# Patient Record
Sex: FEMALE | Race: WHITE | NOT HISPANIC OR LATINO
[De-identification: names, ages, dates, MRNs, and addresses within clinical notes are randomized per-mention and may not be internally consistent; named-entity substitution may affect disease eponyms.]

---

## 2019-07-03 ENCOUNTER — APPOINTMENT (OUTPATIENT)
Dept: GASTROENTEROLOGY | Facility: CLINIC | Age: 59
End: 2019-07-03
Payer: MEDICARE

## 2019-07-03 VITALS
HEIGHT: 65 IN | HEART RATE: 56 BPM | OXYGEN SATURATION: 98 % | DIASTOLIC BLOOD PRESSURE: 80 MMHG | BODY MASS INDEX: 29.99 KG/M2 | WEIGHT: 180 LBS | SYSTOLIC BLOOD PRESSURE: 160 MMHG | RESPIRATION RATE: 16 BRPM

## 2019-07-03 DIAGNOSIS — K59.01 SLOW TRANSIT CONSTIPATION: ICD-10-CM

## 2019-07-03 DIAGNOSIS — R14.0 ABDOMINAL DISTENSION (GASEOUS): ICD-10-CM

## 2019-07-03 DIAGNOSIS — R11.2 NAUSEA WITH VOMITING, UNSPECIFIED: ICD-10-CM

## 2019-07-03 DIAGNOSIS — Z78.9 OTHER SPECIFIED HEALTH STATUS: ICD-10-CM

## 2019-07-03 DIAGNOSIS — E22.0 ACROMEGALY AND PITUITARY GIGANTISM: ICD-10-CM

## 2019-07-03 DIAGNOSIS — Z83.0 FAMILY HISTORY OF HUMAN IMMUNODEFICIENCY VIRUS [HIV] DISEASE: ICD-10-CM

## 2019-07-03 DIAGNOSIS — E78.5 HYPERLIPIDEMIA, UNSPECIFIED: ICD-10-CM

## 2019-07-03 DIAGNOSIS — T40.2X5A DRUG INDUCED CONSTIPATION: ICD-10-CM

## 2019-07-03 DIAGNOSIS — Z80.9 FAMILY HISTORY OF MALIGNANT NEOPLASM, UNSPECIFIED: ICD-10-CM

## 2019-07-03 DIAGNOSIS — F20.9 SCHIZOPHRENIA, UNSPECIFIED: ICD-10-CM

## 2019-07-03 DIAGNOSIS — I10 ESSENTIAL (PRIMARY) HYPERTENSION: ICD-10-CM

## 2019-07-03 DIAGNOSIS — F32.9 MAJOR DEPRESSIVE DISORDER, SINGLE EPISODE, UNSPECIFIED: ICD-10-CM

## 2019-07-03 DIAGNOSIS — K59.03 DRUG INDUCED CONSTIPATION: ICD-10-CM

## 2019-07-03 PROCEDURE — 99204 OFFICE O/P NEW MOD 45 MIN: CPT

## 2019-07-03 RX ORDER — FLUOXETINE HYDROCHLORIDE 20 MG/1
20 CAPSULE ORAL
Refills: 0 | Status: ACTIVE | COMMUNITY

## 2019-07-03 RX ORDER — SITAGLIPTIN 100 MG/1
TABLET, FILM COATED ORAL
Refills: 0 | Status: ACTIVE | COMMUNITY

## 2019-07-03 RX ORDER — ARIPIPRAZOLE 9.75 MG/1.3ML
INJECTION, SOLUTION INTRAMUSCULAR
Refills: 0 | Status: ACTIVE | COMMUNITY

## 2019-07-03 RX ORDER — BUPROPION HYDROCHLORIDE 75 MG/1
TABLET, FILM COATED ORAL
Refills: 0 | Status: ACTIVE | COMMUNITY

## 2019-07-03 RX ORDER — METHYLNALTREXONE BROMIDE 150 MG/1
150 TABLET ORAL DAILY
Qty: 90 | Refills: 2 | Status: ACTIVE | COMMUNITY
Start: 2019-07-03 | End: 1900-01-01

## 2019-07-03 RX ORDER — OCTREOTIDE ACETATE 30 MG
30 KIT INTRAMUSCULAR
Refills: 0 | Status: ACTIVE | COMMUNITY

## 2019-07-03 RX ORDER — LISINOPRIL 20 MG/1
20 TABLET ORAL
Refills: 0 | Status: ACTIVE | COMMUNITY

## 2019-07-03 RX ORDER — ATORVASTATIN CALCIUM 40 MG/1
40 TABLET, FILM COATED ORAL
Refills: 0 | Status: ACTIVE | COMMUNITY

## 2019-07-03 RX ORDER — METHADONE HYDROCHLORIDE 5 MG/1
TABLET ORAL
Refills: 0 | Status: ACTIVE | COMMUNITY

## 2019-07-03 RX ORDER — SODIUM SULFATE, POTASSIUM SULFATE, MAGNESIUM SULFATE 17.5; 3.13; 1.6 G/ML; G/ML; G/ML
17.5-3.13-1.6 SOLUTION, CONCENTRATE ORAL
Qty: 1 | Refills: 0 | Status: ACTIVE | COMMUNITY
Start: 2019-07-03 | End: 1900-01-01

## 2019-07-03 NOTE — PHYSICAL EXAM
[General Appearance - In No Acute Distress] : in no acute distress [Sclera] : the sclera and conjunctiva were normal [General Appearance - Alert] : alert [Outer Ear] : the ears and nose were normal in appearance [PERRL With Normal Accommodation] : pupils were equal in size, round, and reactive to light [Extraocular Movements] : extraocular movements were intact [Neck Appearance] : the appearance of the neck was normal [Oropharynx] : the oropharynx was normal [Jugular Venous Distention Increased] : there was no jugular-venous distention [Neck Cervical Mass (___cm)] : no neck mass was observed [Thyroid Nodule] : there were no palpable thyroid nodules [Thyroid Diffuse Enlargement] : the thyroid was not enlarged [Auscultation Breath Sounds / Voice Sounds] : lungs were clear to auscultation bilaterally [Heart Rate And Rhythm] : heart rate was normal and rhythm regular [Heart Sounds] : normal S1 and S2 [Heart Sounds Gallop] : no gallops [Murmurs] : no murmurs [Abdomen Soft] : soft [Heart Sounds Pericardial Friction Rub] : no pericardial rub [Bowel Sounds] : normal bowel sounds [Abdomen Tenderness] : non-tender [Abdomen Mass (___ Cm)] : no abdominal mass palpated [Abnormal Walk] : normal gait [Nail Clubbing] : no clubbing  or cyanosis of the fingernails [Skin Color & Pigmentation] : normal skin color and pigmentation [Motor Tone] : muscle strength and tone were normal [Musculoskeletal - Swelling] : no joint swelling seen [Skin Turgor] : normal skin turgor [] : no rash [Deep Tendon Reflexes (DTR)] : deep tendon reflexes were 2+ and symmetric [Sensation] : the sensory exam was normal to light touch and pinprick [No Focal Deficits] : no focal deficits [Oriented To Time, Place, And Person] : oriented to person, place, and time [Impaired Insight] : insight and judgment were intact [Affect] : the affect was normal

## 2019-07-03 NOTE — ASSESSMENT
[FreeTextEntry1] : The patient presents with worsening constipation despite treatment with Linzess. It is possible that her constipation may be related to her Methadone use and would therefore benefit from Relistor which is better suited for opioid induced constipation. A prescription was sent to her pharmacy. She will continue the MiraLAX as needed and add a fiber supplement like Benefiber daily. \par She will obtain routine lab work consisting of a CBC, CMP, and TSH.\par Her last colonosocpy was over 4 years ago and she has a family history of colon polyps. She will be scheduled for a colonoscopy with Suprep and a dose of magnesium citrate at noon on prep day. I have discussed the indications (including but not limited to ruling out inflammatory bowel disease, colorectal neoplasm, GI bleed, and AVM's), benefits, risks  (including but not limited to reaction to the anesthesia, infection, bleeding, and perforation),  and alternatives to colonoscopy with the patient. The patient understands all options and has agreed to have a colonoscopy and is medically optimized for the planned procedure. \par \par GI attending:  History, physical, the assessment, plan generated by a nurse practitioner, Suzanne Bernal has been reviewed and confirmed. Patient has a prolonged history of opiate induced constipation. There is distant history of rectal bleeding and a family history of colon polyps. Last colonoscopy was over 4 years ago. Linzess provided  no significant improvement.  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 q.d. will be attempted.\par Colonoscopy appropriate and will be arranged for citrate of magnesia along with the usual prep, will be utilized. Blood work prior to the procedure will be requested. Results to follow. \par She will follow up in 3-4 months.

## 2019-07-03 NOTE — HISTORY OF PRESENT ILLNESS
[Heartburn] : denies heartburn [Nausea] : denies nausea [Diarrhea] : denies diarrhea [Vomiting] : stable vomiting [Constipation] : constipation worsened [Abdominal Pain] : denies abdominal pain [Yellow Skin Or Eyes (Jaundice)] : denies jaundice [Abdominal Swelling] : abdominal swelling stable [Wt Loss ___ Lbs] : recent [unfilled] ~Upound(s) weight loss [Rectal Pain] : denies rectal pain [de-identified] : LAKISHA BATISTA is a 58 year old female presenting today with complaints of She reports that she has had constipation issues for most of her life but it has worsened over the last year. She moves her bowels 1-2 times a week and feels that she does not fully evacuate. She denies abdominal pain but does report cramping, mostly in the mornings as well as nausea and occasional vomiting in the mornings as well. She denies reflux symptoms. She has lost roughly 15 lbs in the last 2 months but she attributes it to a change in her psych meds. She has a history of schizophrenia. She has also been on Methadone for over 15 years. She was seeing a GI doctor in [place] who had prescribed her Linzess 290 mcg. She states that it worked "only a little" for the first week and then stopped. She has been on it for a little over 1 month. She also uses MiraLAX BID and occasionally magnesium citrate.  Her last colonoscopy was 4 years ago. She has no family history of colon cancer but her mother has had colon polyps.

## 2019-10-03 ENCOUNTER — APPOINTMENT (OUTPATIENT)
Dept: GASTROENTEROLOGY | Facility: GI CENTER | Age: 59
End: 2019-10-03
Payer: MEDICARE

## 2019-10-03 ENCOUNTER — OUTPATIENT (OUTPATIENT)
Dept: OUTPATIENT SERVICES | Facility: HOSPITAL | Age: 59
LOS: 1 days | End: 2019-10-03
Payer: MEDICARE

## 2019-10-03 DIAGNOSIS — Z83.71 FAMILY HISTORY OF COLONIC POLYPS: ICD-10-CM

## 2019-10-03 DIAGNOSIS — D17.5 BENIGN LIPOMATOUS NEOPLASM OF INTRA-ABDOMINAL ORGANS: ICD-10-CM

## 2019-10-03 DIAGNOSIS — Z12.11 ENCOUNTER FOR SCREENING FOR MALIGNANT NEOPLASM OF COLON: ICD-10-CM

## 2019-10-03 DIAGNOSIS — R14.0 ABDOMINAL DISTENSION (GASEOUS): ICD-10-CM

## 2019-10-03 LAB — GLUCOSE BLDC GLUCOMTR-MCNC: 153 MG/DL — HIGH (ref 70–99)

## 2019-10-03 PROCEDURE — G0105: CPT

## 2019-10-03 PROCEDURE — 82962 GLUCOSE BLOOD TEST: CPT

## 2019-10-03 PROCEDURE — 45378 DIAGNOSTIC COLONOSCOPY: CPT

## 2019-10-03 NOTE — PHYSICAL EXAM
[General Appearance - Alert] : alert [General Appearance - In No Acute Distress] : in no acute distress [Sclera] : the sclera and conjunctiva were normal [PERRL With Normal Accommodation] : pupils were equal in size, round, and reactive to light [Extraocular Movements] : extraocular movements were intact [Outer Ear] : the ears and nose were normal in appearance [Oropharynx] : the oropharynx was normal [Neck Appearance] : the appearance of the neck was normal [Neck Cervical Mass (___cm)] : no neck mass was observed [Jugular Venous Distention Increased] : there was no jugular-venous distention [Thyroid Nodule] : there were no palpable thyroid nodules [Thyroid Diffuse Enlargement] : the thyroid was not enlarged [Auscultation Breath Sounds / Voice Sounds] : lungs were clear to auscultation bilaterally [Heart Rate And Rhythm] : heart rate was normal and rhythm regular [Heart Sounds] : normal S1 and S2 [Heart Sounds Gallop] : no gallops [Murmurs] : no murmurs [Heart Sounds Pericardial Friction Rub] : no pericardial rub [Bowel Sounds] : normal bowel sounds [Abdomen Tenderness] : non-tender [Abdomen Soft] : soft [] : no hepato-splenomegaly [Abdomen Mass (___ Cm)] : no abdominal mass palpated [No Rectal Mass] : no rectal mass [Normal Sphincter Tone] : normal sphincter tone [Occult Blood Positive] : stool was negative for occult blood [FreeTextEntry1] : Empty rectal vault.

## 2019-10-03 NOTE — PROCEDURE
[Colon Cancer Screening] : colon cancer screening [Fm Hx of Colon Ca/Polyps] : family history of colon cancer and/or polyps [Procedure Explained] : The procedure was explained [Allergies Reviewed] : allergies reviewed. [Risks] : Risks [Benefits] : benefits [Alternatives] : alternatives [Consent Obtained] : written consent was obtained prior to the procedure and is detailed in the patient's record [Patient] : the patient [Bowel Prep Kit] : the patient took the appropriate bowel preparation kit as directed [Approved Diet Followed] : the patient avoided solid foods and adhered to the approved diet list for 24 hours prior to the procedure [Automated Blood Pressure Cuff] : automated blood pressure cuff [Cardiac Monitor] : cardiac monitor [Pulse Oximeter] : pulse oximeter [Propofol ___ mg IV] : Propofol [unfilled] ~Umg intravenously [3] : 3 [Prep Qualtiy: ___] : Prep Quality:  [unfilled] [Withdrawal Time: ___] : Withdrawal Time:  [unfilled] [Abnormal Rectum] : a normal rectum [Left Lateral Decubitus] : The patient was positioned in the left lateral decubitus position [External Hemorrhoids] : no external hemorrhoids [Cecum (Landmarks/Transillum)] : and guided to the cecum which was identified by the anatomic landmarks of the appendiceal orifice and ileocecal valve and by transillumination in the right lower quadrant [No Difficulty] : without difficulty [Insufflated] : insufflated [Multiple Passes Needed] : after multiple passes [Retroflex View] : a retroflex view of the rectum was performed [Lipoma] : lipoma [Normal] : Normal [Tolerated Well] : the patient tolerated the procedure well [Vital Signs Stable] : the vital signs were stable [No Complications] : There were no complications [de-identified] : Mostly seen from afar.  ICV was normal.  TI not entered.   [de-identified] : [id number] [de-identified] : a large 2 cm lipoma was seen in the AC.  Normal overlying mucosa.  no bx.   [de-identified] : Lipoma in AC.  Air insufflation failure.  Fixed. Required extensive lavage and suctioning to get a fair prep.

## 2019-10-03 NOTE — HISTORY OF PRESENT ILLNESS
[FreeTextEntry1] : 57 yo WF with DM. HTN, HLD, Chronic pain on Narcs, OIC better with Relastor.  + FH of colon polyps.  Neg screening colonoscopy 4+ yrs ago.

## 2019-10-03 NOTE — PHYSICAL EXAM
[General Appearance - Alert] : alert [General Appearance - In No Acute Distress] : in no acute distress [Sclera] : the sclera and conjunctiva were normal [PERRL With Normal Accommodation] : pupils were equal in size, round, and reactive to light [Extraocular Movements] : extraocular movements were intact [Outer Ear] : the ears and nose were normal in appearance [Oropharynx] : the oropharynx was normal [Neck Appearance] : the appearance of the neck was normal [Neck Cervical Mass (___cm)] : no neck mass was observed [Jugular Venous Distention Increased] : there was no jugular-venous distention [Thyroid Nodule] : there were no palpable thyroid nodules [Thyroid Diffuse Enlargement] : the thyroid was not enlarged [Heart Rate And Rhythm] : heart rate was normal and rhythm regular [Auscultation Breath Sounds / Voice Sounds] : lungs were clear to auscultation bilaterally [Heart Sounds Gallop] : no gallops [Heart Sounds] : normal S1 and S2 [Murmurs] : no murmurs [Heart Sounds Pericardial Friction Rub] : no pericardial rub [Bowel Sounds] : normal bowel sounds [Abdomen Tenderness] : non-tender [Abdomen Soft] : soft [] : no hepato-splenomegaly [Abdomen Mass (___ Cm)] : no abdominal mass palpated [Normal Sphincter Tone] : normal sphincter tone [No Rectal Mass] : no rectal mass [Occult Blood Positive] : stool was negative for occult blood [FreeTextEntry1] : Empty rectal vault.

## 2019-10-03 NOTE — ASSESSMENT
[FreeTextEntry1] : Negative screening colonoscopy;  Given + FH of colon polyps, Repeat in 5 yrs advised for screening.  High fiber diet for constipation, OIC. GI OV in 6 months.

## 2019-10-03 NOTE — REASON FOR VISIT
[Follow-Up: _____] : a [unfilled] follow-up visit [Colonoscopy] : a colonoscopy [FreeTextEntry2] : +FH of colon polyps

## 2019-10-03 NOTE — PROCEDURE
[Colon Cancer Screening] : colon cancer screening [Fm Hx of Colon Ca/Polyps] : family history of colon cancer and/or polyps [Procedure Explained] : The procedure was explained [Allergies Reviewed] : allergies reviewed. [Risks] : Risks [Benefits] : benefits [Alternatives] : alternatives [Consent Obtained] : written consent was obtained prior to the procedure and is detailed in the patient's record [Bowel Prep Kit] : the patient took the appropriate bowel preparation kit as directed [Patient] : the patient [Approved Diet Followed] : the patient avoided solid foods and adhered to the approved diet list for 24 hours prior to the procedure [Automated Blood Pressure Cuff] : automated blood pressure cuff [Cardiac Monitor] : cardiac monitor [Pulse Oximeter] : pulse oximeter [Propofol ___ mg IV] : Propofol [unfilled] ~Umg intravenously [3] : 3 [Prep Qualtiy: ___] : Prep Quality:  [unfilled] [Withdrawal Time: ___] : Withdrawal Time:  [unfilled] [Left Lateral Decubitus] : The patient was positioned in the left lateral decubitus position [Abnormal Rectum] : a normal rectum [External Hemorrhoids] : no external hemorrhoids [Cecum (Landmarks/Transillum)] : and guided to the cecum which was identified by the anatomic landmarks of the appendiceal orifice and ileocecal valve and by transillumination in the right lower quadrant [No Difficulty] : without difficulty [Insufflated] : insufflated [Multiple Passes Needed] : after multiple passes [Retroflex View] : a retroflex view of the rectum was performed [Lipoma] : lipoma [Normal] : Normal [Tolerated Well] : the patient tolerated the procedure well [Vital Signs Stable] : the vital signs were stable [No Complications] : There were no complications [de-identified] : [id number] [de-identified] : Mostly seen from afar.  ICV was normal.  TI not entered.   [de-identified] : a large 2 cm lipoma was seen in the AC.  Normal overlying mucosa.  no bx.   [de-identified] : Lipoma in AC.  Air insufflation failure.  Fixed. Required extensive lavage and suctioning to get a fair prep.

## 2019-10-03 NOTE — HISTORY OF PRESENT ILLNESS
[FreeTextEntry1] : 59 yo WF with DM. HTN, HLD, Chronic pain on Narcs, OIC better with Relastor.  + FH of colon polyps.  Neg screening colonoscopy 4+ yrs ago.

## 2021-10-06 PROBLEM — I10 ESSENTIAL HYPERTENSION: Status: ACTIVE | Noted: 2019-07-03

## 2022-06-29 ENCOUNTER — OFFICE (OUTPATIENT)
Dept: URBAN - METROPOLITAN AREA CLINIC 105 | Facility: CLINIC | Age: 62
Setting detail: OPHTHALMOLOGY
End: 2022-06-29
Payer: MEDICARE

## 2022-06-29 DIAGNOSIS — E11.9: ICD-10-CM

## 2022-06-29 DIAGNOSIS — H52.7: ICD-10-CM

## 2022-06-29 DIAGNOSIS — H25.013: ICD-10-CM

## 2022-06-29 DIAGNOSIS — E05.00: ICD-10-CM

## 2022-06-29 DIAGNOSIS — H35.372: ICD-10-CM

## 2022-06-29 DIAGNOSIS — H40.013: ICD-10-CM

## 2022-06-29 PROCEDURE — 76514 ECHO EXAM OF EYE THICKNESS: CPT | Performed by: OPHTHALMOLOGY

## 2022-06-29 PROCEDURE — 92004 COMPRE OPH EXAM NEW PT 1/>: CPT | Performed by: OPHTHALMOLOGY

## 2022-06-29 PROCEDURE — 92015 DETERMINE REFRACTIVE STATE: CPT | Performed by: OPHTHALMOLOGY

## 2022-06-29 PROCEDURE — 92250 FUNDUS PHOTOGRAPHY W/I&R: CPT | Performed by: OPHTHALMOLOGY

## 2022-06-29 ASSESSMENT — REFRACTION_CURRENTRX
OD_AXIS: 002
OD_OVR_VA: 20/
OS_CYLINDER: -1.50
OD_AXIS: 180
OS_SPHERE: -0.75
OD_SPHERE: -1.00
OS_OVR_VA: 20/
OS_AXIS: 168
OS_SPHERE: -0.75
OD_CYLINDER: -1.00
OS_OVR_VA: 20/
OD_OVR_VA: 20/
OS_CYLINDER: -1.50
OS_AXIS: 162
OD_SPHERE: -1.00
OD_CYLINDER: -1.00

## 2022-06-29 ASSESSMENT — REFRACTION_MANIFEST
OD_VA1: 20/25-2
OS_VA1: 20/20-1
OS_CYLINDER: -1.75
OD_ADD: +2.25
OS_SPHERE: -0.50
OS_ADD: +2.25
OS_AXIS: 065
OD_SPHERE: -0.50

## 2022-06-29 ASSESSMENT — KERATOMETRY
OS_K2POWER_DIOPTERS: 47.50
OD_K2POWER_DIOPTERS: 46.25
OS_AXISANGLE_DEGREES: 078
OD_K1POWER_DIOPTERS: 45.50
OD_AXISANGLE_DEGREES: 096
OS_K1POWER_DIOPTERS: 45.75

## 2022-06-29 ASSESSMENT — REFRACTION_AUTOREFRACTION
OD_CYLINDER: -0.25
OS_CYLINDER: -1.50
OD_SPHERE: -0.50
OD_AXIS: 112
OS_SPHERE: -0.75
OS_AXIS: 169

## 2022-06-29 ASSESSMENT — PACHYMETRY
OD_CT_UM: 562
OD_CT_CORRECTION: -1
OS_CT_CORRECTION: 3
OS_CT_UM: 505

## 2022-06-29 ASSESSMENT — VISUAL ACUITY
OD_BCVA: 20/20-1
OS_BCVA: 20/25-1

## 2022-06-29 ASSESSMENT — CONFRONTATIONAL VISUAL FIELD TEST (CVF)
OS_FINDINGS: FULL
OD_FINDINGS: FULL

## 2022-06-29 ASSESSMENT — SPHEQUIV_DERIVED
OS_SPHEQUIV: -1.5
OD_SPHEQUIV: -0.625
OS_SPHEQUIV: -1.375

## 2022-06-29 ASSESSMENT — AXIALLENGTH_DERIVED
OD_AL: 22.9794
OS_AL: 22.9953
OS_AL: 23.0416

## 2022-06-29 ASSESSMENT — TONOMETRY: OD_IOP_MMHG: 21

## 2022-11-11 ENCOUNTER — APPOINTMENT (OUTPATIENT)
Dept: ORTHOPEDIC SURGERY | Facility: CLINIC | Age: 62
End: 2022-11-11

## 2022-12-01 ENCOUNTER — OFFICE (OUTPATIENT)
Dept: URBAN - METROPOLITAN AREA CLINIC 113 | Facility: CLINIC | Age: 62
Setting detail: OPHTHALMOLOGY
End: 2022-12-01
Payer: MEDICARE

## 2022-12-01 DIAGNOSIS — H40.013: ICD-10-CM

## 2022-12-01 DIAGNOSIS — H25.013: ICD-10-CM

## 2022-12-01 PROBLEM — H35.372 EPIRETINAL MEMBRANE; LEFT EYE: Status: ACTIVE | Noted: 2022-06-29

## 2022-12-01 PROBLEM — H40.033 NARROW ANGLES; BOTH EYES: Status: ACTIVE | Noted: 2022-12-01

## 2022-12-01 PROBLEM — E05.00 GRAVES/THYROID OPH NO CRISIS: Status: ACTIVE | Noted: 2022-06-29

## 2022-12-01 PROBLEM — H52.7 REFRACTIVE ERROR: Status: ACTIVE | Noted: 2022-06-29

## 2022-12-01 PROBLEM — D35.2 PITUITARY TUMOR BENIGN: Status: ACTIVE | Noted: 2022-12-01

## 2022-12-01 PROBLEM — H18.513 ENDOTHELIAL CORNEAL DYSTROPHY; BOTH EYES: Status: ACTIVE | Noted: 2022-12-01

## 2022-12-01 PROBLEM — E11.9: Status: ACTIVE | Noted: 2022-06-29

## 2022-12-01 PROCEDURE — 92020 GONIOSCOPY: CPT | Performed by: OPHTHALMOLOGY

## 2022-12-01 PROCEDURE — 92250 FUNDUS PHOTOGRAPHY W/I&R: CPT | Performed by: OPHTHALMOLOGY

## 2022-12-01 PROCEDURE — 99214 OFFICE O/P EST MOD 30 MIN: CPT | Performed by: OPHTHALMOLOGY

## 2022-12-01 ASSESSMENT — REFRACTION_MANIFEST
OD_SPHERE: -0.50
OS_SPHERE: -0.50
OS_CYLINDER: -1.75
OD_VA1: 20/25-2
OS_ADD: +2.25
OS_VA1: 20/20-1
OS_AXIS: 065
OD_ADD: +2.25

## 2022-12-01 ASSESSMENT — AXIALLENGTH_DERIVED
OS_AL: 23.3129
OD_AL: 23.1162
OS_AL: 23.1711

## 2022-12-01 ASSESSMENT — REFRACTION_CURRENTRX
OS_OVR_VA: 20/
OS_CYLINDER: -1.50
OD_CYLINDER: -1.00
OD_OVR_VA: 20/
OS_OVR_VA: 20/
OS_CYLINDER: -1.50
OS_AXIS: 162
OS_SPHERE: -0.75
OD_SPHERE: -1.00
OD_CYLINDER: -1.00
OS_SPHERE: -0.75
OD_SPHERE: -1.00
OD_OVR_VA: 20/
OD_AXIS: 002
OS_AXIS: 168
OD_AXIS: 180

## 2022-12-01 ASSESSMENT — KERATOMETRY
OS_K2POWER_DIOPTERS: 47.25
OS_AXISANGLE_DEGREES: 084
OS_K1POWER_DIOPTERS: 45.00
OD_K1POWER_DIOPTERS: 45.25
OD_K2POWER_DIOPTERS: 46.25
OD_AXISANGLE_DEGREES: 089

## 2022-12-01 ASSESSMENT — REFRACTION_AUTOREFRACTION
OD_SPHERE: -0.75
OS_AXIS: 168
OD_AXIS: 160
OS_CYLINDER: -1.50
OD_CYLINDER: -0.25
OS_SPHERE: -1.00

## 2022-12-01 ASSESSMENT — TONOMETRY
OD_IOP_MMHG: 15
OS_IOP_MMHG: 17

## 2022-12-01 ASSESSMENT — SPHEQUIV_DERIVED
OS_SPHEQUIV: -1.75
OD_SPHEQUIV: -0.875
OS_SPHEQUIV: -1.375

## 2022-12-01 ASSESSMENT — VISUAL ACUITY
OS_BCVA: 20/30-1
OD_BCVA: 20/20

## 2022-12-01 ASSESSMENT — PACHYMETRY
OS_CT_CORRECTION: 3
OD_CT_CORRECTION: -1
OD_CT_UM: 562
OS_CT_UM: 505

## 2022-12-01 ASSESSMENT — CONFRONTATIONAL VISUAL FIELD TEST (CVF)
OD_FINDINGS: FULL
OS_FINDINGS: FULL

## 2023-01-23 ENCOUNTER — OFFICE (OUTPATIENT)
Dept: URBAN - METROPOLITAN AREA CLINIC 94 | Facility: CLINIC | Age: 63
Setting detail: OPHTHALMOLOGY
End: 2023-01-23
Payer: MEDICARE

## 2023-01-23 DIAGNOSIS — Z20.822: ICD-10-CM

## 2023-01-23 DIAGNOSIS — Z01.812: ICD-10-CM

## 2023-01-23 PROCEDURE — 99211 OFF/OP EST MAY X REQ PHY/QHP: CPT | Performed by: OPHTHALMOLOGY

## 2023-01-24 ASSESSMENT — REFRACTION_MANIFEST
OS_SPHERE: -0.50
OD_SPHERE: -0.50
OD_VA1: 20/25-2
OS_ADD: +2.25
OS_AXIS: 065
OD_ADD: +2.25
OS_CYLINDER: -1.75
OS_VA1: 20/20-1

## 2023-01-24 ASSESSMENT — AXIALLENGTH_DERIVED
OS_AL: 23.3129
OD_AL: 23.1162
OS_AL: 23.1711

## 2023-01-24 ASSESSMENT — REFRACTION_CURRENTRX
OS_OVR_VA: 20/
OS_SPHERE: -0.75
OD_OVR_VA: 20/
OD_CYLINDER: -1.00
OS_OVR_VA: 20/
OD_AXIS: 180
OS_CYLINDER: -1.50
OS_CYLINDER: -1.50
OD_AXIS: 002
OS_SPHERE: -0.75
OD_SPHERE: -1.00
OD_SPHERE: -1.00
OD_OVR_VA: 20/
OS_AXIS: 162
OS_AXIS: 168
OD_CYLINDER: -1.00

## 2023-01-24 ASSESSMENT — VISUAL ACUITY
OS_BCVA: 20/30-1
OD_BCVA: 20/20

## 2023-01-24 ASSESSMENT — REFRACTION_AUTOREFRACTION
OD_SPHERE: -0.75
OD_CYLINDER: -0.25
OS_AXIS: 168
OD_AXIS: 160
OS_SPHERE: -1.00
OS_CYLINDER: -1.50

## 2023-01-24 ASSESSMENT — SPHEQUIV_DERIVED
OD_SPHEQUIV: -0.875
OS_SPHEQUIV: -1.75
OS_SPHEQUIV: -1.375

## 2023-01-24 ASSESSMENT — KERATOMETRY
OD_AXISANGLE_DEGREES: 089
OD_K1POWER_DIOPTERS: 45.25
OS_K2POWER_DIOPTERS: 47.25
OD_K2POWER_DIOPTERS: 46.25
OS_K1POWER_DIOPTERS: 45.00
OS_AXISANGLE_DEGREES: 084

## 2023-01-26 ENCOUNTER — ASC (OUTPATIENT)
Dept: URBAN - METROPOLITAN AREA SURGERY 8 | Facility: SURGERY | Age: 63
Setting detail: OPHTHALMOLOGY
End: 2023-01-26
Payer: MEDICARE

## 2023-01-26 DIAGNOSIS — H40.031: ICD-10-CM

## 2023-01-26 PROCEDURE — 66761 REVISION OF IRIS: CPT | Performed by: OPHTHALMOLOGY

## 2023-01-26 ASSESSMENT — REFRACTION_CURRENTRX
OD_CYLINDER: -1.00
OD_OVR_VA: 20/
OD_SPHERE: -1.00
OD_SPHERE: -1.00
OS_AXIS: 162
OD_OVR_VA: 20/
OS_OVR_VA: 20/
OS_OVR_VA: 20/
OD_CYLINDER: -1.00
OS_SPHERE: -0.75
OS_CYLINDER: -1.50
OD_AXIS: 002
OS_AXIS: 168
OS_CYLINDER: -1.50
OS_SPHERE: -0.75
OD_AXIS: 180

## 2023-01-26 ASSESSMENT — KERATOMETRY
OS_K2POWER_DIOPTERS: 47.25
OS_AXISANGLE_DEGREES: 084
OD_K1POWER_DIOPTERS: 45.25
OD_AXISANGLE_DEGREES: 089
OS_K1POWER_DIOPTERS: 45.00
OD_K2POWER_DIOPTERS: 46.25

## 2023-01-26 ASSESSMENT — AXIALLENGTH_DERIVED
OD_AL: 23.1162
OS_AL: 23.1711
OS_AL: 23.3129

## 2023-01-26 ASSESSMENT — REFRACTION_MANIFEST
OS_SPHERE: -0.50
OS_AXIS: 065
OD_SPHERE: -0.50
OS_VA1: 20/20-1
OS_CYLINDER: -1.75
OS_ADD: +2.25
OD_VA1: 20/25-2
OD_ADD: +2.25

## 2023-01-26 ASSESSMENT — REFRACTION_AUTOREFRACTION
OS_SPHERE: -1.00
OS_CYLINDER: -1.50
OD_CYLINDER: -0.25
OD_AXIS: 160
OS_AXIS: 168
OD_SPHERE: -0.75

## 2023-01-26 ASSESSMENT — VISUAL ACUITY
OS_BCVA: 20/30-1
OD_BCVA: 20/20

## 2023-01-26 ASSESSMENT — SPHEQUIV_DERIVED
OS_SPHEQUIV: -1.75
OD_SPHEQUIV: -0.875
OS_SPHEQUIV: -1.375

## 2023-01-27 ENCOUNTER — ASC (OUTPATIENT)
Dept: URBAN - METROPOLITAN AREA SURGERY 8 | Facility: SURGERY | Age: 63
Setting detail: OPHTHALMOLOGY
End: 2023-01-27
Payer: MEDICARE

## 2023-01-27 DIAGNOSIS — H40.032: ICD-10-CM

## 2023-01-27 PROBLEM — H25.13 CATARACT SENILE NUCLEAR SCLEROSIS; BOTH EYES: Status: ACTIVE | Noted: 2023-01-23

## 2023-01-27 PROCEDURE — 66761 REVISION OF IRIS: CPT | Performed by: OPHTHALMOLOGY

## 2023-01-27 ASSESSMENT — KERATOMETRY
OS_K2POWER_DIOPTERS: 47.25
OS_K1POWER_DIOPTERS: 45.00
OD_K1POWER_DIOPTERS: 45.25
OS_AXISANGLE_DEGREES: 084
OD_K2POWER_DIOPTERS: 46.25
OD_AXISANGLE_DEGREES: 089

## 2023-01-27 ASSESSMENT — SPHEQUIV_DERIVED
OD_SPHEQUIV: -0.875
OS_SPHEQUIV: -1.375
OS_SPHEQUIV: -1.75

## 2023-01-27 ASSESSMENT — REFRACTION_CURRENTRX
OS_CYLINDER: -1.50
OD_OVR_VA: 20/
OD_OVR_VA: 20/
OD_CYLINDER: -1.00
OS_AXIS: 168
OS_OVR_VA: 20/
OD_AXIS: 180
OS_AXIS: 162
OS_SPHERE: -0.75
OD_AXIS: 002
OS_CYLINDER: -1.50
OS_SPHERE: -0.75
OD_CYLINDER: -1.00
OD_SPHERE: -1.00
OD_SPHERE: -1.00
OS_OVR_VA: 20/

## 2023-01-27 ASSESSMENT — VISUAL ACUITY
OD_BCVA: 20/20
OS_BCVA: 20/30-1

## 2023-01-27 ASSESSMENT — REFRACTION_AUTOREFRACTION
OD_CYLINDER: -0.25
OS_AXIS: 168
OD_AXIS: 160
OD_SPHERE: -0.75
OS_SPHERE: -1.00
OS_CYLINDER: -1.50

## 2023-01-27 ASSESSMENT — AXIALLENGTH_DERIVED
OD_AL: 23.1162
OS_AL: 23.3129
OS_AL: 23.1711

## 2023-01-27 ASSESSMENT — REFRACTION_MANIFEST
OS_CYLINDER: -1.75
OD_ADD: +2.25
OD_VA1: 20/25-2
OS_AXIS: 065
OD_SPHERE: -0.50
OS_VA1: 20/20-1
OS_SPHERE: -0.50
OS_ADD: +2.25

## 2023-04-19 ENCOUNTER — APPOINTMENT (OUTPATIENT)
Dept: ORTHOPEDIC SURGERY | Facility: CLINIC | Age: 63
End: 2023-04-19
Payer: MEDICARE

## 2023-04-19 VITALS — BODY MASS INDEX: 29.99 KG/M2 | WEIGHT: 180 LBS | HEIGHT: 65 IN

## 2023-04-19 PROCEDURE — 99204 OFFICE O/P NEW MOD 45 MIN: CPT | Mod: 25

## 2023-04-19 PROCEDURE — 72100 X-RAY EXAM L-S SPINE 2/3 VWS: CPT

## 2023-04-23 NOTE — HISTORY OF PRESENT ILLNESS
[de-identified] : 4/19/23: 61 y/o F presenting for an initial evaluation of L spine. Chronic hx of L spine issues for years. She reports increased low back pain starting approx 8 months ago. She also reports a fall a few months ago, re-aggravating her symptoms. Today, she complaints of axial lower back pain, pain radiating into the left buttock, does not extend into the leg, and instability while ambulating. In the past, she has treated with chiropractic therapy. There was some element of PT at the chiropractic office. Severity of pain ranges from mild to severe. Limited with naids due to CKD, but she has taken ibuprofen with relief. Pain improves with rest. No neurologic symptoms or change in LE strength. No bowel/bladder dysfunction. No constitutional symptoms. General medical health is good.  [FreeTextEntry5] : The patient is a 62 year old  hand dominant female who presents today complaining of lower back pain that radiates to the sacrum area, down to gluteus and rotator hip\par Date of Injury/Onset:  2/2023\par Pain:    At Rest: 1-2/10 \par With Activity:  10/10 \par Mechanism of injury: Cleaning up the attic of her house stepped in a vulnerable area and the sealing fell apart and the Rt leg went through the sheet rock.\par Quality of symptoms: Cant move, weakness\par Improves with: Rest, laying\par Worse with: Getting up from bed, getting up from a sitting position, standing for prolonged times\par Prior treatment: None\par Prior Imaging: None\par Out of work/sport: _, since _\par School/Sport/Position/Occupation: Retired\par Additional Information: None\par

## 2023-04-23 NOTE — PHYSICAL EXAM
[Normal Coordination] : normal coordination [Normal DTR UE/LE] : normal DTR UE/LE  [Normal Sensation] : normal sensation [Normal Mood and Affect] : normal mood and affect [Orientated] : orientated [Able to Communicate] : able to communicate [Normal Skin] : normal skin [No Rash] : no rash [No Ulcers] : no ulcers [No Lesions] : no lesions [No obvious lymphadenopathy in areas examined] : no obvious lymphadenopathy in areas examined [Well Developed] : well developed [Peripheral vascular exam is grossly normal] : peripheral vascular exam is grossly normal [No Respiratory Distress] : no respiratory distress [de-identified] : Constitutional:\par - General Appearance:\par Unremarkable\par Body Habitus\par Well Developed\par Well Nourished\par Body Habitus\par No Deformities\par Well Groomed\par Ability To communicate:\par Normal\par Neurologic:\par Global sensation is intact to upper and lower extremities. See examination of Neck and/or Spine\par for exceptions.\par Orientation to Time, Place and Person is: Normal\par Mood And Affect is Normal\par Skin:\par - Head/Face, Right Upper/Lower Extremity, Left Upper/Lower Extremity: Normal\par See Examination of Neck and/or Spine for exceptions\par Cardiovascular:\par Peripheral Cardiovascular System is Normal\par Palpation of Lymph Nodes:\par Normal Palpation of lymph nodes in: Axilla, Cervical, Inguinal\par Abnormal Palpation of lymph nodes in: None  [] : non-antalgic

## 2023-04-23 NOTE — DISCUSSION/SUMMARY
[de-identified] : In office x-rays Lumbar spine ap/lat demonstrates spondylolisthesis L4/5. \par Patient was provided with a referral for lumbar physical therapy to work on stretching, strengthening and range of motion.\par  Discussed limitation of treatment with nsaids due to kidney dx. Continue Tylenol PRN.\par MRI request and injections if symptoms do not improve from PT. Discussed surgical decompression and fusion if refractory to non operative mgmt.\par F/u 5/6 wks. \par \par Prior to appointment and during encounter with patient extensive medical records were reviewed including but not limited to, hospital records, outpatient records, imaging results, and lab data.During this appointment the patient was examined, diagnoses were discussed and explained in a face to face manner. In addition extensive time was spent reviewing aforementioned diagnostic studies. Counseling including abnormal image results, differential diagnoses, treatment options, risk and benefits, lifestyle changes, current condition, and current medications was performed. Patient's comments, questions, and concerns were addressed and patient verbalized understanding. Based on this patient's presentation at our office, which is an orthopedic spine surgeon's office, this patient inherently / intrinsically has a risk, however minute, of developing issues such as Cauda equina syndrome, bowel and bladder changes, or progression of motor or neurological deficits such as paralysis which may be permanent.\par \par LOUISE HORVATH Acting as a Scribe for Dr. Aline JEONG, Louise Horvath, attest that this documentation has been prepared under the direction and in the presence of Provider Sloan Miramontes MD.

## 2023-08-04 ENCOUNTER — APPOINTMENT (OUTPATIENT)
Dept: ORTHOPEDIC SURGERY | Facility: CLINIC | Age: 63
End: 2023-08-04

## 2023-08-21 ENCOUNTER — APPOINTMENT (OUTPATIENT)
Dept: ORTHOPEDIC SURGERY | Facility: CLINIC | Age: 63
End: 2023-08-21
Payer: MEDICARE

## 2023-08-21 DIAGNOSIS — Z87.2 PERSONAL HISTORY OF DISEASES OF THE SKIN AND SUBCUTANEOUS TISSUE: ICD-10-CM

## 2023-08-21 PROCEDURE — 99214 OFFICE O/P EST MOD 30 MIN: CPT

## 2023-08-22 ENCOUNTER — APPOINTMENT (OUTPATIENT)
Dept: MRI IMAGING | Facility: CLINIC | Age: 63
End: 2023-08-22
Payer: MEDICARE

## 2023-08-22 PROCEDURE — 72148 MRI LUMBAR SPINE W/O DYE: CPT

## 2023-08-24 ENCOUNTER — TRANSCRIPTION ENCOUNTER (OUTPATIENT)
Age: 63
End: 2023-08-24

## 2023-09-04 NOTE — HISTORY OF PRESENT ILLNESS
[7] : 7 [0] : 0 [Retired] : Work status: retired [de-identified] : 08/21/2023 - Patient returns for follow up regarding lumbar spine. She is currently enrolled in physical therapy for back pain. She has had four visits to this point. Continues to have low back pain with left more than right leg symptoms including pain, numbness, and tingling.   4/19/23: 63 y/o F presenting for an initial evaluation of L spine. Chronic hx of L spine issues for years. She reports increased low back pain starting approx 8 months ago. She also reports a fall a few months ago, re-aggravating her symptoms. Today, she complaints of axial lower back pain, pain radiating into the left buttock, does not extend into the leg, and instability while ambulating. In the past, she has treated with chiropractic therapy. There was some element of PT at the chiropractic office. Severity of pain ranges from mild to severe. Limited with naids due to CKD, but she has taken ibuprofen with relief. Pain improves with rest. No neurologic symptoms or change in LE strength. No bowel/bladder dysfunction. No constitutional symptoms. General medical health is good.  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

## 2023-09-04 NOTE — DATA REVIEWED
[Outside X-rays] : outside x-rays [Lumbar Spine] : lumbar spine [I independently reviewed and interpreted images and report] : I independently reviewed and interpreted images and report [FreeTextEntry1] : I stop paperwork reviewed PT progress notes reviewed

## 2023-09-04 NOTE — DISCUSSION/SUMMARY
[de-identified] : 61 y/o F with spondylolisthesis L4/5. Discussed limitation of treatment with nsaids due to kidney dx. Continue Tylenol PRN. Plan obtain MRI lumbar spine to evaluate for stenosis. In the interim continue with physical therapy . Recommend rheumatology follow up for evaluation of systemic Rhuematological condition. Dermatologist had suggested possibility of psoriatic arthritis. FUV after MRI is complete. Discussed interventional spine injections vs surgical decompression and fusion if symptoms persist and are functionally incapacitating.  Prior to appointment and during encounter with patient extensive medical records were reviewed including but not limited to, hospital records, outpatient records, imaging results, and lab data.During this appointment the patient was examined, diagnoses were discussed and explained in a face to face manner. In addition extensive time was spent reviewing aforementioned diagnostic studies. Counseling including abnormal image results, differential diagnoses, treatment options, risk and benefits, lifestyle changes, current condition, and current medications was performed. Patient's comments, questions, and concerns were addressed and patient verbalized understanding. Based on this patient's presentation at our office, which is an orthopedic spine surgeon's office, this patient inherently / intrinsically has a risk, however minute, of developing issues such as Cauda equina syndrome, bowel and bladder changes, or progression of motor or neurological deficits such as paralysis which may be permanent.

## 2023-09-04 NOTE — PHYSICAL EXAM
[Normal Coordination] : normal coordination [Normal DTR UE/LE] : normal DTR UE/LE  [Normal Sensation] : normal sensation [Normal Mood and Affect] : normal mood and affect [Orientated] : orientated [Able to Communicate] : able to communicate [Normal Skin] : normal skin [No Rash] : no rash [No Ulcers] : no ulcers [No Lesions] : no lesions [No obvious lymphadenopathy in areas examined] : no obvious lymphadenopathy in areas examined [Well Developed] : well developed [Peripheral vascular exam is grossly normal] : peripheral vascular exam is grossly normal [No Respiratory Distress] : no respiratory distress [de-identified] : Constitutional:\par  - General Appearance:\par  Unremarkable\par  Body Habitus\par  Well Developed\par  Well Nourished\par  Body Habitus\par  No Deformities\par  Well Groomed\par  Ability To communicate:\par  Normal\par  Neurologic:\par  Global sensation is intact to upper and lower extremities. See examination of Neck and/or Spine\par  for exceptions.\par  Orientation to Time, Place and Person is: Normal\par  Mood And Affect is Normal\par  Skin:\par  - Head/Face, Right Upper/Lower Extremity, Left Upper/Lower Extremity: Normal\par  See Examination of Neck and/or Spine for exceptions\par  Cardiovascular:\par  Peripheral Cardiovascular System is Normal\par  Palpation of Lymph Nodes:\par  Normal Palpation of lymph nodes in: Axilla, Cervical, Inguinal\par  Abnormal Palpation of lymph nodes in: None  [] : non-antalgic [FreeTextEntry3] : bilateral hand arthritic changes with Heberden's nodes and distal joints

## 2023-09-15 ENCOUNTER — APPOINTMENT (OUTPATIENT)
Dept: ORTHOPEDIC SURGERY | Facility: CLINIC | Age: 63
End: 2023-09-15
Payer: MEDICARE

## 2023-09-15 VITALS — BODY MASS INDEX: 29.99 KG/M2 | WEIGHT: 180 LBS | HEIGHT: 65 IN

## 2023-09-15 VITALS — BODY MASS INDEX: 29.99 KG/M2 | HEIGHT: 65 IN | WEIGHT: 180 LBS

## 2023-09-15 DIAGNOSIS — M54.16 RADICULOPATHY, LUMBAR REGION: ICD-10-CM

## 2023-09-15 DIAGNOSIS — M43.16 SPONDYLOLISTHESIS, LUMBAR REGION: ICD-10-CM

## 2023-09-15 PROCEDURE — 99213 OFFICE O/P EST LOW 20 MIN: CPT

## 2023-09-17 PROBLEM — M43.16 SPONDYLOLISTHESIS OF LUMBAR REGION: Status: ACTIVE | Noted: 2023-08-21

## 2023-09-17 PROBLEM — M54.16 LUMBAR RADICULOPATHY: Status: ACTIVE | Noted: 2023-04-19

## 2023-10-09 ENCOUNTER — APPOINTMENT (OUTPATIENT)
Dept: ORTHOPEDIC SURGERY | Facility: CLINIC | Age: 63
End: 2023-10-09

## 2024-01-23 ENCOUNTER — OFFICE (OUTPATIENT)
Dept: URBAN - METROPOLITAN AREA CLINIC 113 | Facility: CLINIC | Age: 64
Setting detail: OPHTHALMOLOGY
End: 2024-01-23
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
## 2024-01-23 PROCEDURE — NO SHOW FE NO SHOW FEE: Performed by: STUDENT IN AN ORGANIZED HEALTH CARE EDUCATION/TRAINING PROGRAM

## 2024-02-01 ENCOUNTER — OFFICE (OUTPATIENT)
Dept: URBAN - METROPOLITAN AREA CLINIC 113 | Facility: CLINIC | Age: 64
Setting detail: OPHTHALMOLOGY
End: 2024-02-01
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
## 2024-02-01 PROCEDURE — NO SHOW FE NO SHOW FEE: Performed by: OPHTHALMOLOGY

## 2024-03-29 ENCOUNTER — OFFICE (OUTPATIENT)
Dept: URBAN - METROPOLITAN AREA CLINIC 113 | Facility: CLINIC | Age: 64
Setting detail: OPHTHALMOLOGY
End: 2024-03-29
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
## 2024-03-29 PROCEDURE — NO SHOW FE NO SHOW FEE: Performed by: OPHTHALMOLOGY

## 2024-05-08 ENCOUNTER — OFFICE (OUTPATIENT)
Dept: URBAN - METROPOLITAN AREA CLINIC 113 | Facility: CLINIC | Age: 64
Setting detail: OPHTHALMOLOGY
End: 2024-05-08
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
## 2024-05-08 PROCEDURE — NO SHOW FE NO SHOW FEE: Performed by: OPTOMETRIST

## 2024-07-24 ENCOUNTER — OFFICE (OUTPATIENT)
Dept: URBAN - METROPOLITAN AREA CLINIC 94 | Facility: CLINIC | Age: 64
Setting detail: OPHTHALMOLOGY
End: 2024-07-24
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
## 2024-07-24 PROCEDURE — NO SHOW FE NO SHOW FEE: Performed by: OPHTHALMOLOGY

## 2024-08-08 ENCOUNTER — OFFICE (OUTPATIENT)
Dept: URBAN - METROPOLITAN AREA CLINIC 12 | Facility: CLINIC | Age: 64
Setting detail: OPHTHALMOLOGY
End: 2024-08-08
Payer: MEDICARE

## 2024-08-08 DIAGNOSIS — E11.9: ICD-10-CM

## 2024-08-08 DIAGNOSIS — H25.13: ICD-10-CM

## 2024-08-08 DIAGNOSIS — H52.4: ICD-10-CM

## 2024-08-08 DIAGNOSIS — H35.372: ICD-10-CM

## 2024-08-08 DIAGNOSIS — H40.033: ICD-10-CM

## 2024-08-08 DIAGNOSIS — H18.513: ICD-10-CM

## 2024-08-08 PROCEDURE — 92250 FUNDUS PHOTOGRAPHY W/I&R: CPT | Performed by: STUDENT IN AN ORGANIZED HEALTH CARE EDUCATION/TRAINING PROGRAM

## 2024-08-08 PROCEDURE — 92020 GONIOSCOPY: CPT | Performed by: STUDENT IN AN ORGANIZED HEALTH CARE EDUCATION/TRAINING PROGRAM

## 2024-08-08 PROCEDURE — 92015 DETERMINE REFRACTIVE STATE: CPT | Performed by: STUDENT IN AN ORGANIZED HEALTH CARE EDUCATION/TRAINING PROGRAM

## 2024-08-08 PROCEDURE — 92014 COMPRE OPH EXAM EST PT 1/>: CPT | Performed by: STUDENT IN AN ORGANIZED HEALTH CARE EDUCATION/TRAINING PROGRAM

## 2024-08-08 ASSESSMENT — CONFRONTATIONAL VISUAL FIELD TEST (CVF)
OS_FINDINGS: FULL
OD_FINDINGS: FULL

## 2024-08-29 ENCOUNTER — RX ONLY (RX ONLY)
Age: 64
End: 2024-08-29

## 2024-08-29 ENCOUNTER — OFFICE (OUTPATIENT)
Dept: URBAN - METROPOLITAN AREA CLINIC 12 | Facility: CLINIC | Age: 64
Setting detail: OPHTHALMOLOGY
End: 2024-08-29
Payer: MEDICARE

## 2024-08-29 DIAGNOSIS — H40.033: ICD-10-CM

## 2024-08-29 DIAGNOSIS — H18.513: ICD-10-CM

## 2024-08-29 DIAGNOSIS — H25.13: ICD-10-CM

## 2024-08-29 PROBLEM — E11.9 DIABETES TYPE 2 NO RETINOPATHY ; BOTH EYES: Status: ACTIVE | Noted: 2024-08-08

## 2024-08-29 PROCEDURE — 99213 OFFICE O/P EST LOW 20 MIN: CPT | Performed by: STUDENT IN AN ORGANIZED HEALTH CARE EDUCATION/TRAINING PROGRAM

## 2024-08-29 PROCEDURE — 92133 CPTRZD OPH DX IMG PST SGM ON: CPT | Performed by: STUDENT IN AN ORGANIZED HEALTH CARE EDUCATION/TRAINING PROGRAM

## 2024-08-29 ASSESSMENT — CONFRONTATIONAL VISUAL FIELD TEST (CVF)
OD_FINDINGS: FULL
OS_FINDINGS: FULL

## 2024-11-27 ENCOUNTER — OFFICE (OUTPATIENT)
Dept: URBAN - METROPOLITAN AREA CLINIC 12 | Facility: CLINIC | Age: 64
Setting detail: OPHTHALMOLOGY
End: 2024-11-27
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
## 2024-11-27 PROCEDURE — NO SHOW FE NO SHOW FEE: Performed by: STUDENT IN AN ORGANIZED HEALTH CARE EDUCATION/TRAINING PROGRAM

## 2025-04-10 ENCOUNTER — APPOINTMENT (OUTPATIENT)
Dept: ORTHOPEDIC SURGERY | Facility: CLINIC | Age: 65
End: 2025-04-10
Payer: MEDICARE

## 2025-04-10 VITALS — WEIGHT: 165 LBS | HEIGHT: 65 IN | BODY MASS INDEX: 27.49 KG/M2

## 2025-04-10 DIAGNOSIS — Z00.00 ENCOUNTER FOR GENERAL ADULT MEDICAL EXAMINATION W/OUT ABNORMAL FINDINGS: ICD-10-CM

## 2025-04-10 DIAGNOSIS — M76.821 POSTERIOR TIBIAL TENDINITIS, RIGHT LEG: ICD-10-CM

## 2025-04-10 DIAGNOSIS — M20.21 HALLUX RIGIDUS, RIGHT FOOT: ICD-10-CM

## 2025-04-10 PROCEDURE — 99214 OFFICE O/P EST MOD 30 MIN: CPT

## 2025-04-10 PROCEDURE — 73630 X-RAY EXAM OF FOOT: CPT | Mod: RT

## 2025-04-10 PROCEDURE — 73600 X-RAY EXAM OF ANKLE: CPT | Mod: RT

## 2025-04-10 RX ORDER — ESCITALOPRAM OXALATE 20 MG/1
TABLET ORAL
Refills: 0 | Status: ACTIVE | COMMUNITY

## 2025-04-10 RX ORDER — MELOXICAM 15 MG/1
15 TABLET ORAL DAILY
Qty: 30 | Refills: 1 | Status: ACTIVE | COMMUNITY
Start: 2025-04-10 | End: 2025-06-09